# Patient Record
Sex: MALE | Race: WHITE | NOT HISPANIC OR LATINO
[De-identification: names, ages, dates, MRNs, and addresses within clinical notes are randomized per-mention and may not be internally consistent; named-entity substitution may affect disease eponyms.]

---

## 2017-02-17 PROBLEM — Z00.00 ENCOUNTER FOR PREVENTIVE HEALTH EXAMINATION: Noted: 2017-02-17

## 2017-04-17 ENCOUNTER — APPOINTMENT (OUTPATIENT)
Dept: DERMATOLOGY | Facility: CLINIC | Age: 42
End: 2017-04-17

## 2017-05-31 ENCOUNTER — APPOINTMENT (OUTPATIENT)
Dept: DERMATOLOGY | Facility: CLINIC | Age: 42
End: 2017-05-31

## 2017-10-19 ENCOUNTER — EMERGENCY (EMERGENCY)
Facility: HOSPITAL | Age: 42
LOS: 1 days | Discharge: HOME | End: 2017-10-19
Attending: EMERGENCY MEDICINE | Admitting: EMERGENCY MEDICINE
Payer: COMMERCIAL

## 2017-10-19 PROCEDURE — 99053 MED SERV 10PM-8AM 24 HR FAC: CPT

## 2017-10-19 PROCEDURE — 99285 EMERGENCY DEPT VISIT HI MDM: CPT | Mod: 25

## 2017-10-20 VITALS
OXYGEN SATURATION: 99 % | HEART RATE: 62 BPM | DIASTOLIC BLOOD PRESSURE: 75 MMHG | RESPIRATION RATE: 18 BRPM | TEMPERATURE: 98 F | SYSTOLIC BLOOD PRESSURE: 117 MMHG

## 2017-10-20 VITALS
OXYGEN SATURATION: 96 % | TEMPERATURE: 98 F | DIASTOLIC BLOOD PRESSURE: 90 MMHG | HEART RATE: 97 BPM | RESPIRATION RATE: 16 BRPM | SYSTOLIC BLOOD PRESSURE: 136 MMHG

## 2017-10-20 PROCEDURE — 72141 MRI NECK SPINE W/O DYE: CPT

## 2017-10-20 PROCEDURE — 99284 EMERGENCY DEPT VISIT MOD MDM: CPT | Mod: 25

## 2017-10-20 PROCEDURE — 72125 CT NECK SPINE W/O DYE: CPT | Mod: 26

## 2017-10-20 PROCEDURE — 72125 CT NECK SPINE W/O DYE: CPT

## 2017-10-20 PROCEDURE — 72141 MRI NECK SPINE W/O DYE: CPT | Mod: 26

## 2017-10-20 RX ORDER — DIAZEPAM 5 MG
5 TABLET ORAL ONCE
Qty: 0 | Refills: 0 | Status: DISCONTINUED | OUTPATIENT
Start: 2017-10-20 | End: 2017-10-20

## 2017-10-20 RX ORDER — OXYCODONE HYDROCHLORIDE 5 MG/1
1 TABLET ORAL
Qty: 12 | Refills: 0 | OUTPATIENT
Start: 2017-10-20 | End: 2017-10-23

## 2017-10-20 RX ORDER — IBUPROFEN 200 MG
600 TABLET ORAL ONCE
Qty: 0 | Refills: 0 | Status: COMPLETED | OUTPATIENT
Start: 2017-10-20 | End: 2017-10-20

## 2017-10-20 RX ORDER — DIAZEPAM 5 MG
1 TABLET ORAL
Qty: 9 | Refills: 0 | OUTPATIENT
Start: 2017-10-20 | End: 2017-10-23

## 2017-10-20 RX ORDER — OXYCODONE HYDROCHLORIDE 5 MG/1
10 TABLET ORAL ONCE
Qty: 0 | Refills: 0 | Status: DISCONTINUED | OUTPATIENT
Start: 2017-10-20 | End: 2017-10-20

## 2017-10-20 RX ADMIN — OXYCODONE HYDROCHLORIDE 10 MILLIGRAM(S): 5 TABLET ORAL at 05:20

## 2017-10-20 RX ADMIN — Medication 600 MILLIGRAM(S): at 07:51

## 2017-10-20 RX ADMIN — Medication 5 MILLIGRAM(S): at 07:50

## 2017-10-20 RX ADMIN — OXYCODONE HYDROCHLORIDE 10 MILLIGRAM(S): 5 TABLET ORAL at 04:47

## 2017-10-20 RX ADMIN — Medication 600 MILLIGRAM(S): at 00:16

## 2017-10-20 RX ADMIN — Medication 5 MILLIGRAM(S): at 01:14

## 2017-10-20 RX ADMIN — Medication 600 MILLIGRAM(S): at 07:50

## 2017-10-20 NOTE — ED PROVIDER NOTE - CARE PLAN
Principal Discharge DX:	Neck pain  Instructions for follow-up, activity and diet:	1) Please follow-up with your primary care doctor within the next 3-5 days. If having persistent neck pain, you may follow-up with Dr. Farley / Dr. Guevara ( orthopedics ([phone]) for other orthopedic spine follow-up)  Please call today or tomorrow for an appointment.  If you cannot follow-up with your doctor(s), please return to the ED for any urgent issues.  2) If you have any worsening of symptoms or any other concerns please return to the ED immediately.  3) Please continue taking your home medications as directed. You may take acetaminophen (Tylenol) and ibuprofen (Motrin) as per instructions on the medication box for fever/pain, do not exceed the recommended daily limits. Please take VALIUM / OXYCODONE every 6 hours, as needed, for SEVERE pain. Please do not drive, make any important decisions or operate heavy machinery while on this medication.   4) You may have been given a copy of your labs and/or imaging.  Please go over these with your primary care doctor. You do not have any findings on your CT / MRI of your neck requiring immediate intervention.

## 2017-10-20 NOTE — ED PROVIDER NOTE - PLAN OF CARE
1) Please follow-up with your primary care doctor within the next 3-5 days. If having persistent neck pain, you may follow-up with Dr. Farley / Dr. Guevara ( orthopedics ([phone]) for other orthopedic spine follow-up)  Please call today or tomorrow for an appointment.  If you cannot follow-up with your doctor(s), please return to the ED for any urgent issues.  2) If you have any worsening of symptoms or any other concerns please return to the ED immediately.  3) Please continue taking your home medications as directed. You may take acetaminophen (Tylenol) and ibuprofen (Motrin) as per instructions on the medication box for fever/pain, do not exceed the recommended daily limits. Please take VALIUM / OXYCODONE every 6 hours, as needed, for SEVERE pain. Please do not drive, make any important decisions or operate heavy machinery while on this medication.   4) You may have been given a copy of your labs and/or imaging.  Please go over these with your primary care doctor. You do not have any findings on your CT / MRI of your neck requiring immediate intervention.

## 2017-10-20 NOTE — ED PROVIDER NOTE - OBJECTIVE STATEMENT
41yo male pt, , no PMHx /o left sided neck pain, radiating pain to left arm s/p pulling while working today. Denies fall or other injuries. Denies head injury. Denies sensory changes or weakness to extremities. Denies CP/SOB/ABD pain or N/V. Denies Pelvic or hip pain. Denies fever, chills or recent sickness.
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
## 2017-10-20 NOTE — ED PROVIDER NOTE - PROGRESS NOTE DETAILS
Signout endorsed to me by Dr. Corcoran, patient with improving paresthesias of the BUE, MRI read of cervical neck pending.  - Wilfrido Johnson MD Patient feels improved without C-collar, no urgent MRI findings, will provide outpt follow-up.  - Wilfrido Johnson MD Dr. Corcoran (Attending Physician)  Signed out to me. Developed bilateral hand numbness in ulnar nerve distribution while in ED. Will get MRI c-spine.
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
## 2017-10-20 NOTE — ED ADULT NURSE REASSESSMENT NOTE - NS ED NURSE REASSESS COMMENT FT1
Report received from Luli HELMS. Patient A&Ox3, C-collar in place. Reports continued pain. Nilo MULLINS notified. CT negative. Awaiting dispo. Report received from Luli HELMS, no patient chart found. Patient A&Ox3, C-collar in place. Reports continued pain. Nilo MULLINS notified. CT negative. Awaiting dispo.

## 2017-10-20 NOTE — ED PROVIDER NOTE - PHYSICAL EXAMINATION
NAD, VSS, Afebrile, No facial or scalp tender, No spinal mid tender, + left cervical trapezium tender without swelling or lesion, Neuro- intact.

## 2017-10-20 NOTE — ED ADULT NURSE REASSESSMENT NOTE - NS ED NURSE REASSESS COMMENT FT1
Patient reports continued pain, PO oxycodone administered. Patient states numbness/tinglingling in b/l 4th and 5th digits. MRI ordered

## 2017-10-20 NOTE — ED ADULT NURSE REASSESSMENT NOTE - NS ED NURSE REASSESS COMMENT FT1
Patient return from MRI. Report given to Laurie HELMS. Awaiting MRI results. Patient return from MRI. Report given to Sofia HELMS. Awaiting MRI results.

## 2018-01-19 ENCOUNTER — EMERGENCY (EMERGENCY)
Facility: HOSPITAL | Age: 43
LOS: 1 days | Discharge: HOME | End: 2018-01-19
Attending: EMERGENCY MEDICINE
Payer: COMMERCIAL

## 2018-01-19 VITALS
OXYGEN SATURATION: 95 % | DIASTOLIC BLOOD PRESSURE: 76 MMHG | HEART RATE: 81 BPM | SYSTOLIC BLOOD PRESSURE: 127 MMHG | TEMPERATURE: 98 F | RESPIRATION RATE: 19 BRPM

## 2018-01-19 VITALS — DIASTOLIC BLOOD PRESSURE: 80 MMHG | RESPIRATION RATE: 16 BRPM | HEART RATE: 80 BPM | SYSTOLIC BLOOD PRESSURE: 138 MMHG

## 2018-01-19 DIAGNOSIS — Z98.890 OTHER SPECIFIED POSTPROCEDURAL STATES: Chronic | ICD-10-CM

## 2018-01-19 PROCEDURE — 73562 X-RAY EXAM OF KNEE 3: CPT

## 2018-01-19 PROCEDURE — 73562 X-RAY EXAM OF KNEE 3: CPT | Mod: 26,LT

## 2018-01-19 PROCEDURE — 99283 EMERGENCY DEPT VISIT LOW MDM: CPT | Mod: 25

## 2018-01-19 PROCEDURE — 99283 EMERGENCY DEPT VISIT LOW MDM: CPT

## 2018-01-19 RX ORDER — IBUPROFEN 200 MG
600 TABLET ORAL ONCE
Qty: 0 | Refills: 0 | Status: COMPLETED | OUTPATIENT
Start: 2018-01-19 | End: 2018-01-19

## 2018-01-19 RX ORDER — ACETAMINOPHEN 500 MG
975 TABLET ORAL ONCE
Qty: 0 | Refills: 0 | Status: COMPLETED | OUTPATIENT
Start: 2018-01-19 | End: 2018-01-19

## 2018-01-19 RX ADMIN — Medication 600 MILLIGRAM(S): at 09:30

## 2018-01-19 RX ADMIN — Medication 975 MILLIGRAM(S): at 09:30

## 2018-01-19 NOTE — ED PROVIDER NOTE - MEDICAL DECISION MAKING DETAILS
[facility] PGY3: knee pain after placing weight on knee with heavy gear on. will obtain xray. likely soft tissue injury, no laxity to joint. will wrap and crutch train and follow with ortho.
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
## 2018-01-19 NOTE — ED PROVIDER NOTE - PLAN OF CARE
Follow up with your primary care doctor within 48-72 hours.   You must return for new, worsening or concerning symptoms; specifically including those listed on the attached sheet.   Follow up with an orthopedic doctor of your choice  listed on the attached sheet within 1 week for continued evaluation and care of your injury. Call the office tomorrow and explain your condition and make an appointment as soon as possible.  You may take 600mg of ibuprofen (example: motrin or advil) every 4-6 hours for baseline pain control as indicated with respect to the warnings on the label. This is an over the counter medication.   You may take 1000mg of Tylenol every 6 hours for baseline pain control with respect to the warnings on the label.

## 2018-01-19 NOTE — ED PROVIDER NOTE - MUSCULOSKELETAL, MLM
left knee with medial superior mild ecchymosis and tenderness, mild effusion bilateral superior on knee, ranges knee with mild pain, no patellar tenderness, no joint laxity.

## 2018-01-19 NOTE — ED PROVIDER NOTE - PROGRESS NOTE DETAILS
[facility] PGY3: normal xray with no fxr or avulsions. will be discharged with bulky martinez and follow up. offered oxycodone and patient declined.

## 2018-01-19 NOTE — ED PROVIDER NOTE - CARE PLAN
Principal Discharge DX:	Knee injury, left, initial encounter Principal Discharge DX:	Knee injury, left, initial encounter  Assessment and plan of treatment:	Follow up with your primary care doctor within 48-72 hours.   You must return for new, worsening or concerning symptoms; specifically including those listed on the attached sheet.   Follow up with an orthopedic doctor of your choice  listed on the attached sheet within 1 week for continued evaluation and care of your injury. Call the office tomorrow and explain your condition and make an appointment as soon as possible.  You may take 600mg of ibuprofen (example: motrin or advil) every 4-6 hours for baseline pain control as indicated with respect to the warnings on the label. This is an over the counter medication.   You may take 1000mg of Tylenol every 6 hours for baseline pain control with respect to the warnings on the label.

## 2019-10-13 NOTE — ED ADULT NURSE NOTE - CAS DISCH TRANSFER METHOD
PT states he has tried Tylenol and Excedrin for his headache and Ambien for insomnia, but \"nothing has worked\"
Private car

## 2022-03-04 ENCOUNTER — APPOINTMENT (OUTPATIENT)
Dept: GASTROENTEROLOGY | Facility: CLINIC | Age: 47
End: 2022-03-04
Payer: COMMERCIAL

## 2022-03-04 VITALS
DIASTOLIC BLOOD PRESSURE: 82 MMHG | TEMPERATURE: 98.4 F | OXYGEN SATURATION: 98 % | HEIGHT: 75 IN | HEART RATE: 84 BPM | WEIGHT: 238 LBS | RESPIRATION RATE: 14 BRPM | SYSTOLIC BLOOD PRESSURE: 122 MMHG | BODY MASS INDEX: 29.59 KG/M2

## 2022-03-04 DIAGNOSIS — Z78.9 OTHER SPECIFIED HEALTH STATUS: ICD-10-CM

## 2022-03-04 DIAGNOSIS — F17.210 NICOTINE DEPENDENCE, CIGARETTES, UNCOMPLICATED: ICD-10-CM

## 2022-03-04 PROCEDURE — 99204 OFFICE O/P NEW MOD 45 MIN: CPT

## 2022-03-04 RX ORDER — SODIUM SULFATE, POTASSIUM SULFATE, MAGNESIUM SULFATE 17.5; 3.13; 1.6 G/ML; G/ML; G/ML
17.5-3.13-1.6 SOLUTION, CONCENTRATE ORAL
Qty: 1 | Refills: 0 | Status: ACTIVE | COMMUNITY
Start: 2022-03-04 | End: 1900-01-01

## 2022-03-04 NOTE — PHYSICAL EXAM
[General Appearance - Alert] : alert [General Appearance - In No Acute Distress] : in no acute distress [General Appearance - Well Nourished] : well nourished [General Appearance - Well Developed] : well developed [General Appearance - Well-Appearing] : healthy appearing [Sclera] : the sclera and conjunctiva were normal [PERRL With Normal Accommodation] : pupils were equal in size, round, and reactive to light [Extraocular Movements] : extraocular movements were intact [Outer Ear] : the ears and nose were normal in appearance [Hearing Threshold Finger Rub Not [place]] : hearing was normal [Examination Of The Oral Cavity] : the lips and gums were normal [Neck Appearance] : the appearance of the neck was normal [Auscultation Breath Sounds / Voice Sounds] : lungs were clear to auscultation bilaterally [Heart Rate And Rhythm] : heart rate was normal and rhythm regular [Heart Sounds] : normal S1 and S2 [Heart Sounds Gallop] : no gallops [Murmurs] : no murmurs [Heart Sounds Pericardial Friction Rub] : no pericardial rub [Bowel Sounds] : normal bowel sounds [Abdomen Soft] : soft [Abdomen Tenderness] : non-tender [Abdomen Mass (___ Cm)] : no abdominal mass palpated [Abnormal Walk] : normal gait [Nail Clubbing] : no clubbing  or cyanosis of the fingernails [Musculoskeletal - Swelling] : no joint swelling seen [Motor Tone] : muscle strength and tone were normal [Skin Color & Pigmentation] : normal skin color and pigmentation [Skin Turgor] : normal skin turgor [] : no rash [Motor Exam] : the motor exam was normal [No Focal Deficits] : no focal deficits [Oriented To Time, Place, And Person] : oriented to person, place, and time [Impaired Insight] : insight and judgment were intact [Affect] : the affect was normal [Normal Sphincter Tone] : normal sphincter tone [No Rectal Mass] : no rectal mass [External Hemorrhoid] : external hemorrhoids [Occult Blood Positive] : stool was negative for occult blood [FreeTextEntry1] : There is a small external hemorrhoid.

## 2022-03-04 NOTE — ASSESSMENT
[FreeTextEntry1] : The patient symptoms are most consistent with either a prolapsed hemorrhoid and most recently he may have had episodes of thrombosed hemorrhoids.  I have recommended that we proceed with a colonoscopy for further evaluation.  He will obtain a CBC, BMP and prothrombin time prior.  If there are no other significant abnormalities to explain his prior symptoms and if there is significant hemorrhoids present he will require surgical intervention.  Since he does not strain when he defecates and his stools are soft I do not think fiber supplementation will be of any benefit at this time.

## 2022-03-04 NOTE — HISTORY OF PRESENT ILLNESS
[de-identified] : Since age 28 he has had 5 episodes during which she would acutely feel a painful lump in the anal region and on the last 2 occasions the symptom resolved after the lump bled.  The first 3 episodes usually resolve without therapy over 7 to 10 days.  The fourth episode was in the summer 2021 and after 7 or 10 days the painful lump spontaneously bled once and everything resolved.  He had a similar fifth episode in January of this year.  He has never seen a physician for this problem.  He denies any constipation and does not strain when he defecates.  His stools described as soft and daily.  There is no family history of colon cancer.  He has never undergone a colonoscopy.  He denies any abdominal pain, nausea or vomiting.  There are no dyspeptic symptoms.  He is a .

## 2022-03-17 RX ORDER — POLYETHYLENE GLYCOL-3350 AND ELECTROLYTES WITH FLAVOR PACK 240; 5.84; 2.98; 6.72; 22.72 G/278.26G; G/278.26G; G/278.26G; G/278.26G; G/278.26G
240 POWDER, FOR SOLUTION ORAL
Qty: 1 | Refills: 0 | Status: ACTIVE | COMMUNITY
Start: 2022-03-17 | End: 1900-01-01

## 2022-04-06 LAB
ANION GAP SERPL CALC-SCNC: 15 MMOL/L
BASOPHILS # BLD AUTO: 0.07 K/UL
BASOPHILS NFR BLD AUTO: 1 %
BUN SERPL-MCNC: 15 MG/DL
CALCIUM SERPL-MCNC: 10.2 MG/DL
CHLORIDE SERPL-SCNC: 102 MMOL/L
CO2 SERPL-SCNC: 23 MMOL/L
CREAT SERPL-MCNC: 0.88 MG/DL
EGFR: 107 ML/MIN/1.73M2
EOSINOPHIL # BLD AUTO: 0.48 K/UL
EOSINOPHIL NFR BLD AUTO: 6.8 %
GLUCOSE SERPL-MCNC: 104 MG/DL
HCT VFR BLD CALC: 51.5 %
HGB BLD-MCNC: 16.7 G/DL
IMM GRANULOCYTES NFR BLD AUTO: 0.1 %
INR PPP: 0.86 RATIO
LYMPHOCYTES # BLD AUTO: 2.19 K/UL
LYMPHOCYTES NFR BLD AUTO: 31.1 %
MAN DIFF?: NORMAL
MCHC RBC-ENTMCNC: 29.7 PG
MCHC RBC-ENTMCNC: 32.4 GM/DL
MCV RBC AUTO: 91.5 FL
MONOCYTES # BLD AUTO: 0.53 K/UL
MONOCYTES NFR BLD AUTO: 7.5 %
NEUTROPHILS # BLD AUTO: 3.77 K/UL
NEUTROPHILS NFR BLD AUTO: 53.5 %
PLATELET # BLD AUTO: 261 K/UL
POTASSIUM SERPL-SCNC: 4.8 MMOL/L
PT BLD: 10.1 SEC
RBC # BLD: 5.63 M/UL
RBC # FLD: 12 %
SODIUM SERPL-SCNC: 140 MMOL/L
WBC # FLD AUTO: 7.05 K/UL

## 2022-04-08 ENCOUNTER — LABORATORY RESULT (OUTPATIENT)
Age: 47
End: 2022-04-08

## 2022-04-11 ENCOUNTER — TRANSCRIPTION ENCOUNTER (OUTPATIENT)
Age: 47
End: 2022-04-11

## 2022-04-12 ENCOUNTER — APPOINTMENT (OUTPATIENT)
Dept: GASTROENTEROLOGY | Facility: GI CENTER | Age: 47
End: 2022-04-12
Payer: COMMERCIAL

## 2022-04-12 ENCOUNTER — OUTPATIENT (OUTPATIENT)
Dept: OUTPATIENT SERVICES | Facility: HOSPITAL | Age: 47
LOS: 1 days | End: 2022-04-12
Payer: COMMERCIAL

## 2022-04-12 ENCOUNTER — TRANSCRIPTION ENCOUNTER (OUTPATIENT)
Age: 47
End: 2022-04-12

## 2022-04-12 DIAGNOSIS — K62.5 HEMORRHAGE OF ANUS AND RECTUM: ICD-10-CM

## 2022-04-12 DIAGNOSIS — Z98.890 OTHER SPECIFIED POSTPROCEDURAL STATES: Chronic | ICD-10-CM

## 2022-04-12 DIAGNOSIS — K62.89 OTHER SPECIFIED DISEASES OF ANUS AND RECTUM: ICD-10-CM

## 2022-04-12 PROCEDURE — 45378 DIAGNOSTIC COLONOSCOPY: CPT

## 2022-04-12 NOTE — PHYSICAL EXAM
[General Appearance - Alert] : alert [General Appearance - In No Acute Distress] : in no acute distress [General Appearance - Well Nourished] : well nourished [General Appearance - Well Developed] : well developed [General Appearance - Well-Appearing] : healthy appearing [Sclera] : the sclera and conjunctiva were normal [PERRL With Normal Accommodation] : pupils were equal in size, round, and reactive to light [Extraocular Movements] : extraocular movements were intact [Outer Ear] : the ears and nose were normal in appearance [Hearing Threshold Finger Rub Not [place]] : hearing was normal [Examination Of The Oral Cavity] : the lips and gums were normal [Neck Appearance] : the appearance of the neck was normal [Auscultation Breath Sounds / Voice Sounds] : lungs were clear to auscultation bilaterally [Heart Rate And Rhythm] : heart rate was normal and rhythm regular [Heart Sounds] : normal S1 and S2 [Heart Sounds Gallop] : no gallops [Murmurs] : no murmurs [Heart Sounds Pericardial Friction Rub] : no pericardial rub [Bowel Sounds] : normal bowel sounds [Abdomen Soft] : soft [Abdomen Tenderness] : non-tender [Abdomen Mass (___ Cm)] : no abdominal mass palpated [Normal Sphincter Tone] : normal sphincter tone [No Rectal Mass] : no rectal mass [External Hemorrhoid] : external hemorrhoids [Occult Blood Positive] : stool was negative for occult blood [FreeTextEntry1] : There is a small external hemorrhoid. [Abnormal Walk] : normal gait [Nail Clubbing] : no clubbing  or cyanosis of the fingernails [Musculoskeletal - Swelling] : no joint swelling seen [Motor Tone] : muscle strength and tone were normal [Skin Color & Pigmentation] : normal skin color and pigmentation [Skin Turgor] : normal skin turgor [] : no rash [Motor Exam] : the motor exam was normal [No Focal Deficits] : no focal deficits [Oriented To Time, Place, And Person] : oriented to person, place, and time [Impaired Insight] : insight and judgment were intact [Affect] : the affect was normal

## (undated) DEVICE — CSC-COLONOSCOPE 2002772: Type: DURABLE MEDICAL EQUIPMENT

## (undated) DEVICE — VALVE ENDOSCOPE DEFENDO SINGLE USE